# Patient Record
Sex: FEMALE | URBAN - METROPOLITAN AREA
[De-identification: names, ages, dates, MRNs, and addresses within clinical notes are randomized per-mention and may not be internally consistent; named-entity substitution may affect disease eponyms.]

---

## 2017-01-10 ENCOUNTER — TELEPHONE (OUTPATIENT)
Dept: FAMILY MEDICINE CLINIC | Facility: CLINIC | Age: 62
End: 2017-01-10

## 2017-01-10 NOTE — LETTER
1/11/2017 9:27 AM 
 
Ms. Marlyn Chamberlain [facility] [address] [phone] Dear Ms. Daniel: 
 
We've missed you! Please call our office at [phone] and schedule a follow up appointment for your continued care.  
 
 
 
Sincerely, 
 
 
RICK Marlow

## 2017-01-11 NOTE — TELEPHONE ENCOUNTER
Attempted to contact patient Venita Hall regarding need to follow up unable to leave voicemail, will send patient letter. Call back number left and I myself or one of the other nurses will attempt to contact again.

## 2018-09-04 ENCOUNTER — OFFICE (OUTPATIENT)
Dept: URBAN - METROPOLITAN AREA CLINIC 101 | Facility: CLINIC | Age: 63
End: 2018-09-04

## 2018-09-04 VITALS
WEIGHT: 185 LBS | DIASTOLIC BLOOD PRESSURE: 72 MMHG | HEART RATE: 78 BPM | TEMPERATURE: 98.6 F | HEIGHT: 66 IN | SYSTOLIC BLOOD PRESSURE: 118 MMHG

## 2018-09-04 DIAGNOSIS — R10.13 EPIGASTRIC PAIN: ICD-10-CM

## 2018-09-04 DIAGNOSIS — Z86.010 PERSONAL HISTORY OF COLONIC POLYPS: ICD-10-CM

## 2018-09-04 DIAGNOSIS — F45.8 OTHER SOMATOFORM DISORDERS: ICD-10-CM

## 2018-09-04 DIAGNOSIS — R63.4 ABNORMAL WEIGHT LOSS: ICD-10-CM

## 2018-09-04 DIAGNOSIS — K30 FUNCTIONAL DYSPEPSIA: ICD-10-CM

## 2018-09-04 PROCEDURE — 99214 OFFICE O/P EST MOD 30 MIN: CPT

## 2018-11-13 ENCOUNTER — ON CAMPUS - OUTPATIENT (OUTPATIENT)
Dept: URBAN - METROPOLITAN AREA HOSPITAL 35 | Facility: HOSPITAL | Age: 63
End: 2018-11-13
Payer: OTHER GOVERNMENT

## 2018-11-13 DIAGNOSIS — R68.81 EARLY SATIETY: ICD-10-CM

## 2018-11-13 DIAGNOSIS — R10.13 EPIGASTRIC PAIN: ICD-10-CM

## 2018-11-13 DIAGNOSIS — R63.4 ABNORMAL WEIGHT LOSS: ICD-10-CM

## 2018-11-13 DIAGNOSIS — B96.81 HELICOBACTER PYLORI [H. PYLORI] AS THE CAUSE OF DISEASES CLA: ICD-10-CM

## 2018-11-13 DIAGNOSIS — K29.60 OTHER GASTRITIS WITHOUT BLEEDING: ICD-10-CM

## 2018-11-13 PROCEDURE — 43239 EGD BIOPSY SINGLE/MULTIPLE: CPT

## 2019-01-02 ENCOUNTER — OFFICE (OUTPATIENT)
Dept: URBAN - METROPOLITAN AREA CLINIC 33 | Facility: CLINIC | Age: 64
End: 2019-01-02

## 2019-01-02 VITALS
DIASTOLIC BLOOD PRESSURE: 74 MMHG | TEMPERATURE: 97.5 F | HEIGHT: 66 IN | WEIGHT: 188 LBS | SYSTOLIC BLOOD PRESSURE: 132 MMHG | HEART RATE: 69 BPM

## 2019-01-02 DIAGNOSIS — R63.4 ABNORMAL WEIGHT LOSS: ICD-10-CM

## 2019-01-02 DIAGNOSIS — Z86.010 PERSONAL HISTORY OF COLONIC POLYPS: ICD-10-CM

## 2019-01-02 DIAGNOSIS — R63.0 ANOREXIA: ICD-10-CM

## 2019-01-02 DIAGNOSIS — R10.13 EPIGASTRIC PAIN: ICD-10-CM

## 2019-01-02 DIAGNOSIS — B96.81 HELICOBACTER PYLORI [H. PYLORI] AS THE CAUSE OF DISEASES CLA: ICD-10-CM

## 2019-01-02 PROCEDURE — 99215 OFFICE O/P EST HI 40 MIN: CPT

## 2019-01-02 PROCEDURE — 00031: CPT

## 2019-01-02 RX ORDER — LEVOFLOXACIN 500 MG/1
TABLET, FILM COATED ORAL
Qty: 14 | Refills: 0 | Status: COMPLETED
Start: 2019-01-02 | End: 2020-03-05

## 2019-01-02 RX ORDER — AMOXICILLIN 500 MG/1
CAPSULE ORAL
Qty: 56 | Refills: 0 | Status: COMPLETED
Start: 2019-01-02 | End: 2020-03-05

## 2019-01-02 RX ORDER — SUCRALFATE 1 G/1
TABLET ORAL
Qty: 120 | Refills: 1 | Status: COMPLETED
End: 2020-03-05

## 2019-01-02 NOTE — SERVICEHPINOTES
GUSTAVO VENEGAS   is a   63  female who complains of f/u. EGD done 11/13/18 was positive for h pylori. She was given option #1. She developed heart palpitations with clarithromycin and bc of this we switched her to flagyl. She then  developed watery diarrhea.  Stopped treatment approx. 5-7 days in. Stool studies following this were negative along with neg c diff. She was then dx with bronchitis and was given augmentin--finished course a few days ago and had no SEs w/ this. She was taking align and daily yogurt. She reports diarrhea has since resolved and BMs are normal, BSS type 4, 1-2x/day. States she continues to have epigastric pain. Some mild nausea but no vomiting. she was supposed to get CT scan following EGD but states she lost the order. She is concerned about her pancreas. She does not have diabetes. She has lost weight and is concerned about this (per our records is 3 lbs heavier than Sept OV). Render Post-Care Instructions In Note?: no

## 2019-03-13 ENCOUNTER — ON CAMPUS - OUTPATIENT (OUTPATIENT)
Dept: URBAN - METROPOLITAN AREA HOSPITAL 35 | Facility: HOSPITAL | Age: 64
End: 2019-03-13
Payer: OTHER GOVERNMENT

## 2019-03-13 DIAGNOSIS — D12.2 BENIGN NEOPLASM OF ASCENDING COLON: ICD-10-CM

## 2019-03-13 DIAGNOSIS — D12.3 BENIGN NEOPLASM OF TRANSVERSE COLON: ICD-10-CM

## 2019-03-13 DIAGNOSIS — Z86.010 PERSONAL HISTORY OF COLONIC POLYPS: ICD-10-CM

## 2019-03-13 PROCEDURE — 45380 COLONOSCOPY AND BIOPSY: CPT | Mod: PT

## 2019-03-27 ENCOUNTER — OFFICE (OUTPATIENT)
Dept: URBAN - METROPOLITAN AREA CLINIC 33 | Facility: CLINIC | Age: 64
End: 2019-03-27

## 2019-03-27 DIAGNOSIS — R14.0 ABDOMINAL DISTENSION (GASEOUS): ICD-10-CM

## 2019-03-27 DIAGNOSIS — R10.13 EPIGASTRIC PAIN: ICD-10-CM

## 2019-03-27 DIAGNOSIS — R63.4 ABNORMAL WEIGHT LOSS: ICD-10-CM

## 2019-03-27 PROCEDURE — 91065 BREATH HYDROGEN/METHANE TEST: CPT

## 2019-04-30 ENCOUNTER — OFFICE (OUTPATIENT)
Dept: URBAN - METROPOLITAN AREA CLINIC 101 | Facility: CLINIC | Age: 64
End: 2019-04-30

## 2019-04-30 VITALS
SYSTOLIC BLOOD PRESSURE: 152 MMHG | DIASTOLIC BLOOD PRESSURE: 87 MMHG | HEART RATE: 78 BPM | HEIGHT: 66 IN | TEMPERATURE: 97.7 F | WEIGHT: 198 LBS

## 2019-04-30 DIAGNOSIS — R10.33 PERIUMBILICAL PAIN: ICD-10-CM

## 2019-04-30 DIAGNOSIS — R63.0 ANOREXIA: ICD-10-CM

## 2019-04-30 DIAGNOSIS — R63.4 ABNORMAL WEIGHT LOSS: ICD-10-CM

## 2019-04-30 PROCEDURE — 99214 OFFICE O/P EST MOD 30 MIN: CPT

## 2019-08-05 ENCOUNTER — APPOINTMENT (RX ONLY)
Dept: URBAN - METROPOLITAN AREA CLINIC 371 | Facility: CLINIC | Age: 64
Setting detail: DERMATOLOGY
End: 2019-08-05

## 2019-08-05 DIAGNOSIS — D22 MELANOCYTIC NEVI: ICD-10-CM

## 2019-08-05 DIAGNOSIS — D17 BENIGN LIPOMATOUS NEOPLASM: ICD-10-CM

## 2019-08-05 PROBLEM — D22.39 MELANOCYTIC NEVI OF OTHER PARTS OF FACE: Status: ACTIVE | Noted: 2019-08-05

## 2019-08-05 PROBLEM — I10 ESSENTIAL (PRIMARY) HYPERTENSION: Status: ACTIVE | Noted: 2019-08-05

## 2019-08-05 PROBLEM — D17.1 BENIGN LIPOMATOUS NEOPLASM OF SKIN AND SUBCUTANEOUS TISSUE OF TRUNK: Status: ACTIVE | Noted: 2019-08-05

## 2019-08-05 PROBLEM — K21.9 GASTRO-ESOPHAGEAL REFLUX DISEASE WITHOUT ESOPHAGITIS: Status: ACTIVE | Noted: 2019-08-05

## 2019-08-05 PROBLEM — D17.22 BENIGN LIPOMATOUS NEOPLASM OF SKIN AND SUBCUTANEOUS TISSUE OF LEFT ARM: Status: ACTIVE | Noted: 2019-08-05

## 2019-08-05 PROBLEM — D48.5 NEOPLASM OF UNCERTAIN BEHAVIOR OF SKIN: Status: ACTIVE | Noted: 2019-08-05

## 2019-08-05 PROCEDURE — ? COUNSELING

## 2019-08-05 PROCEDURE — 99203 OFFICE O/P NEW LOW 30 MIN: CPT

## 2019-08-05 PROCEDURE — ? TREATMENT REGIMEN

## 2019-08-05 ASSESSMENT — LOCATION DETAILED DESCRIPTION DERM
LOCATION DETAILED: LEFT MEDIAL SUPERIOR CHEST
LOCATION DETAILED: LEFT SUPERIOR CENTRAL MALAR CHEEK
LOCATION DETAILED: LEFT ANTERIOR SHOULDER
LOCATION DETAILED: LEFT POSTAURICULAR SKIN

## 2019-08-05 ASSESSMENT — LOCATION SIMPLE DESCRIPTION DERM
LOCATION SIMPLE: POSTERIOR SCALP
LOCATION SIMPLE: CHEST
LOCATION SIMPLE: LEFT SHOULDER
LOCATION SIMPLE: LEFT CHEEK

## 2019-08-05 ASSESSMENT — LOCATION ZONE DERM
LOCATION ZONE: TRUNK
LOCATION ZONE: ARM
LOCATION ZONE: FACE
LOCATION ZONE: SCALP

## 2019-08-05 NOTE — PROCEDURE: TREATMENT REGIMEN
Plan: Recommended that patient follow up with general surgeon or orthopedist  for removal due to muscle impingement.
Detail Level: Zone

## 2019-09-11 ENCOUNTER — OFFICE (OUTPATIENT)
Dept: URBAN - METROPOLITAN AREA CLINIC 78 | Facility: CLINIC | Age: 64
End: 2019-09-11

## 2019-09-11 PROCEDURE — 00014: CPT

## 2019-10-25 ENCOUNTER — OFFICE (OUTPATIENT)
Dept: URBAN - METROPOLITAN AREA CLINIC 101 | Facility: CLINIC | Age: 64
End: 2019-10-25

## 2019-10-25 PROCEDURE — 00001: CPT

## 2020-03-05 ENCOUNTER — OFFICE (OUTPATIENT)
Dept: URBAN - METROPOLITAN AREA CLINIC 34 | Facility: CLINIC | Age: 65
End: 2020-03-05

## 2020-03-05 VITALS
WEIGHT: 196.4 LBS | SYSTOLIC BLOOD PRESSURE: 130 MMHG | HEART RATE: 78 BPM | TEMPERATURE: 97.3 F | DIASTOLIC BLOOD PRESSURE: 82 MMHG | HEIGHT: 66 IN

## 2020-03-05 DIAGNOSIS — R10.84 GENERALIZED ABDOMINAL PAIN: ICD-10-CM

## 2020-03-05 DIAGNOSIS — K30 FUNCTIONAL DYSPEPSIA: ICD-10-CM

## 2020-03-05 PROCEDURE — 99214 OFFICE O/P EST MOD 30 MIN: CPT | Performed by: INTERNAL MEDICINE

## 2020-03-05 RX ORDER — AMITRIPTYLINE HYDROCHLORIDE 25 MG/1
TABLET, FILM COATED ORAL
Qty: 30 | Refills: 1 | Status: COMPLETED
Start: 2020-03-05 | End: 2021-11-12

## 2020-11-11 ENCOUNTER — TELEHEALTH PROVIDED OTHER THAN IN PATIENT'S HOME (OUTPATIENT)
Dept: URBAN - METROPOLITAN AREA TELEHEALTH 7 | Facility: TELEHEALTH | Age: 65
End: 2020-11-11
Payer: OTHER GOVERNMENT

## 2020-11-11 VITALS — WEIGHT: 197 LBS | HEIGHT: 66 IN

## 2020-11-11 DIAGNOSIS — R10.33 PERIUMBILICAL PAIN: ICD-10-CM

## 2020-11-11 DIAGNOSIS — R10.84 GENERALIZED ABDOMINAL PAIN: ICD-10-CM

## 2020-11-11 DIAGNOSIS — Z86.010 PERSONAL HISTORY OF COLONIC POLYPS: ICD-10-CM

## 2020-11-11 PROCEDURE — 99215 OFFICE O/P EST HI 40 MIN: CPT | Mod: GT | Performed by: INTERNAL MEDICINE

## 2020-11-11 RX ORDER — ESCITALOPRAM OXALATE 10 MG/1
TABLET, FILM COATED ORAL
Qty: 30 | Refills: 2 | Status: COMPLETED
Start: 2020-11-11 | End: 2021-11-12

## 2020-11-11 NOTE — SERVICEHPINOTES
PATIENT VERIFIED BY DATE OF BIRTH AND NAME. Patient has been consented for this telecommunication visit. Ms. Singh is here for follow up for her ongoing abdominal pain symptoms. Described as daily burning, bloating, distension. She's tried various PPI regimens, Carafate, and Levsin with no relief. She has been worked up with EGD, colonoscopy, and imaging including neg CTA imaging. CT imaging revealed left kidney cystic lesions, and she is going to see Urology regarding this. She continues to abuse tobacco daily, found to have atherosclerosis of arteries on CT. Some food fear as well. Tremendous personal stressors as well.  Last year, she tested + for SIBO, but she hasn't responded well to the Xifaxan Rx. She is still getting abdominal pain. This is described as a burning, "gnawing feeling." She gets a trapped gas pain feeling. She still has some constipation with incomplete evacuation and she's tried various laxatives with minimal relief to the pain.  She was given a trial of Amitriptyline and felt "heart racing," so she stopped this medication.  She takes Ambien for sleep and thinks this can cause some constipation.  She's had tremendous stressors with noisy neighbors and involving the police.  Also, the stress of the recent election and pandemic has contributed to her stress and anxiety.All 10 point review of systems have been reviewed as per HPI and otherwise negative. BR

## 2020-11-11 NOTE — SERVICENOTES
Time spent with patient exceeded 60 minutes.  Patient's visit was conducted through video telecommunication. Patient consented before the start of visit as to understanding of privacy concerns, possible technological failure, and their responsibility of carrying out instructions of plan.

## 2021-11-12 ENCOUNTER — TELEHEALTH PROVIDED OTHER THAN IN PATIENT'S HOME (OUTPATIENT)
Dept: URBAN - METROPOLITAN AREA TELEHEALTH 7 | Facility: TELEHEALTH | Age: 66
End: 2021-11-12

## 2021-11-12 VITALS — WEIGHT: 188 LBS | HEIGHT: 66 IN

## 2021-11-12 DIAGNOSIS — Z86.010 PERSONAL HISTORY OF COLONIC POLYPS: ICD-10-CM

## 2021-11-12 DIAGNOSIS — R13.10 DYSPHAGIA, UNSPECIFIED: ICD-10-CM

## 2021-11-12 DIAGNOSIS — R10.33 PERIUMBILICAL PAIN: ICD-10-CM

## 2021-11-12 DIAGNOSIS — R10.84 GENERALIZED ABDOMINAL PAIN: ICD-10-CM

## 2021-11-12 PROCEDURE — 99214 OFFICE O/P EST MOD 30 MIN: CPT | Mod: GT | Performed by: INTERNAL MEDICINE

## 2021-11-12 NOTE — SERVICEHPINOTES
PATIENT VERIFIED BY DATE OF BIRTH AND NAME. Patient has been consented for this telecommunication visit.
dominique foxMs. Singh is her for follow up for GERD and IBS.  Described as daily burning, bloating, distension. She's tried various PPI regimens, Carafate, and Levsin with no relief. She has been worked up with EGD, colonoscopy, and imaging including neg CTA imaging. CT imaging revealed left kidney cystic lesions, and she is going to see Urology regarding this. She continues to abuse tobacco daily, found to have atherosclerosis of arteries on CT. Some food fear as well. Tremendous personal stressors as well. Last year, she tested + for SIBO, but she hasn't responded well to the Xifaxan Rx. She is still getting abdominal pain. This is described as a burning, "gnawing feeling." She gets a trapped gas pain feeling. She still has some constipation with incomplete evacuation and she's tried various laxatives with minimal relief to the pain. She was given a trial of Amitriptyline and felt "heart racing," so she stopped this medication. She takes Ambien for sleep and thinks this can cause some constipation. She's had tremendous stressors with noisy neighbors and involving the police. Also, the stress of the recent election and pandemic has contributed to her stress and anxiety.  These stressors have improved.
br
brLately, she's had some globus and occasional reflux and dysphagia complaints.  Also noticed a streak of bright red blood but this has improved.  Last colon in 2018 which revealed adenomatous polyps.  She continues to abuse tobacco.  All 10 point review of systems have been reviewed as per HPI and otherwise negative.

## 2021-11-15 ENCOUNTER — OFFICE (OUTPATIENT)
Dept: URBAN - METROPOLITAN AREA CLINIC 102 | Facility: CLINIC | Age: 66
End: 2021-11-15

## 2021-11-15 PROCEDURE — 00038: CPT | Performed by: INTERNAL MEDICINE

## 2022-04-11 ENCOUNTER — OFFICE (OUTPATIENT)
Dept: URBAN - METROPOLITAN AREA CLINIC 30 | Facility: CLINIC | Age: 67
End: 2022-04-11
Payer: OTHER GOVERNMENT

## 2022-04-11 ENCOUNTER — OFFICE (OUTPATIENT)
Dept: URBAN - METROPOLITAN AREA PATHOLOGY 18 | Facility: PATHOLOGY | Age: 67
End: 2022-04-11
Payer: OTHER GOVERNMENT

## 2022-04-11 VITALS
HEART RATE: 82 BPM | DIASTOLIC BLOOD PRESSURE: 82 MMHG | HEART RATE: 75 BPM | TEMPERATURE: 97.9 F | OXYGEN SATURATION: 98 % | SYSTOLIC BLOOD PRESSURE: 99 MMHG | SYSTOLIC BLOOD PRESSURE: 124 MMHG | DIASTOLIC BLOOD PRESSURE: 69 MMHG | RESPIRATION RATE: 18 BRPM | HEART RATE: 76 BPM | DIASTOLIC BLOOD PRESSURE: 76 MMHG | WEIGHT: 188 LBS | HEART RATE: 79 BPM | SYSTOLIC BLOOD PRESSURE: 129 MMHG | SYSTOLIC BLOOD PRESSURE: 131 MMHG | DIASTOLIC BLOOD PRESSURE: 79 MMHG | HEIGHT: 66 IN | RESPIRATION RATE: 16 BRPM | SYSTOLIC BLOOD PRESSURE: 106 MMHG | TEMPERATURE: 98.1 F | DIASTOLIC BLOOD PRESSURE: 60 MMHG | SYSTOLIC BLOOD PRESSURE: 112 MMHG | DIASTOLIC BLOOD PRESSURE: 71 MMHG | HEART RATE: 78 BPM | RESPIRATION RATE: 17 BRPM | RESPIRATION RATE: 23 BRPM | OXYGEN SATURATION: 99 % | HEART RATE: 66 BPM | OXYGEN SATURATION: 95 % | SYSTOLIC BLOOD PRESSURE: 143 MMHG | RESPIRATION RATE: 10 BRPM | HEART RATE: 80 BPM | RESPIRATION RATE: 34 BRPM | DIASTOLIC BLOOD PRESSURE: 55 MMHG

## 2022-04-11 DIAGNOSIS — R13.10 DYSPHAGIA, UNSPECIFIED: ICD-10-CM

## 2022-04-11 DIAGNOSIS — K31.89 OTHER DISEASES OF STOMACH AND DUODENUM: ICD-10-CM

## 2022-04-11 DIAGNOSIS — K22.2 ESOPHAGEAL OBSTRUCTION: ICD-10-CM

## 2022-04-11 DIAGNOSIS — Z86.010 PERSONAL HISTORY OF COLONIC POLYPS: ICD-10-CM

## 2022-04-11 DIAGNOSIS — K29.60 OTHER GASTRITIS WITHOUT BLEEDING: ICD-10-CM

## 2022-04-11 DIAGNOSIS — R10.13 EPIGASTRIC PAIN: ICD-10-CM

## 2022-04-11 DIAGNOSIS — B96.81 HELICOBACTER PYLORI [H. PYLORI] AS THE CAUSE OF DISEASES CLA: ICD-10-CM

## 2022-04-11 DIAGNOSIS — K57.30 DIVERTICULOSIS OF LARGE INTESTINE WITHOUT PERFORATION OR ABS: ICD-10-CM

## 2022-04-11 PROCEDURE — 88342 IMHCHEM/IMCYTCHM 1ST ANTB: CPT | Performed by: PATHOLOGY

## 2022-04-11 PROCEDURE — 88305 TISSUE EXAM BY PATHOLOGIST: CPT | Performed by: PATHOLOGY

## 2022-04-11 PROCEDURE — 88313 SPECIAL STAINS GROUP 2: CPT | Performed by: PATHOLOGY

## 2022-04-11 RX ORDER — PHENOBARBITAL, HYOSCYAMINE SULFATE, ATROPINE SULFATE, SCOPOLAMINE HYDROBROMIDE 16.2; .1307; .0194; .0065 MG/5ML; MG/5ML; MG/5ML; MG/5ML
ELIXIR ORAL
Qty: 300 | Refills: 2 | Status: COMPLETED
Start: 2022-04-11 | End: 2023-09-19

## 2023-09-19 ENCOUNTER — TELEHEALTH PROVIDED OTHER THAN IN PATIENT'S HOME (OUTPATIENT)
Dept: URBAN - METROPOLITAN AREA TELEHEALTH 3 | Facility: TELEHEALTH | Age: 68
End: 2023-09-19
Payer: OTHER GOVERNMENT

## 2023-09-19 VITALS — WEIGHT: 183 LBS | HEIGHT: 66 IN

## 2023-09-19 DIAGNOSIS — D32.9 BENIGN NEOPLASM OF MENINGES, UNSPECIFIED: ICD-10-CM

## 2023-09-19 DIAGNOSIS — Z72.0 TOBACCO USE: ICD-10-CM

## 2023-09-19 DIAGNOSIS — R63.4 ABNORMAL WEIGHT LOSS: ICD-10-CM

## 2023-09-19 DIAGNOSIS — K30 FUNCTIONAL DYSPEPSIA: ICD-10-CM

## 2023-09-19 PROCEDURE — 99215 OFFICE O/P EST HI 40 MIN: CPT | Mod: 95 | Performed by: INTERNAL MEDICINE

## 2023-09-19 NOTE — SERVICEHPINOTES
PATIENT VERIFIED BY DATE OF BIRTH AND NAME. Patient has been consented for this telecommunication visit.
dominique MaxwellSusan Singh is her for follow up for GERD and IBS. Described as daily burning, bloating, distension. She's tried various PPI regimens, Carafate, and Levsin with no relief. She has been worked up with EGD, colonoscopy, and imaging including neg CTA imaging. CT imaging revealed left kidney cystic lesions, and she is going to see Urology regarding this. She continues to abuse tobacco daily, found to have atherosclerosis of arteries on CT. Some food fear as well. Tremendous personal stressors as well. Last year, she tested + for SIBO, but she hasn't responded well to the Xifaxan Rx.She is still getting abdominal pain. This is described as a burning, "gnawing feeling." She gets a trapped gas pain feeling. She still has some constipation with incomplete evacuation and she's tried various laxatives with minimal relief to the pain.She was given a trial of Amitriptyline and felt "heart racing," so she stopped this medication. She takes Ambien for sleep and thinks this can cause some constipation. She's had tremendous stressors with noisy neighbors and involving the police. Also, the stress of the recent election and pandemic has contributed to her stress and anxiety. These stressors have improved.She just tested positive for H pylori, about to start Rx.  Also, diagnosed with a meningioma earlier this year and had gamma knife Rx for this at [facility].All 10 point review of systems have been reviewed as per HPI and otherwise negative. dominique

## 2023-09-19 NOTE — SERVICENOTES
Patient's visit was conducted through [employer] video telecommunication. Patient consented before the start of visit as to understanding of privacy concerns, possible technological failure, and their responsibility of carrying out instructions of plan.

## 2023-09-20 PROBLEM — K31.89 OTHER DISEASES OF STOMACH AND DUODENUM: Status: ACTIVE | Noted: 2022-04-11

## 2023-09-20 PROBLEM — K64.0 FIRST DEGREE HEMORRHOIDS: Status: ACTIVE | Noted: 2022-04-11

## 2023-09-20 PROBLEM — K57.30 DVRTCLOS OF LG INT W/O PERFORATION OR ABSCESS W/O BLEEDING: Status: ACTIVE | Noted: 2022-04-11

## 2023-09-20 PROBLEM — K22.2 ESOPHAGEAL OBSTRUCTION: Status: ACTIVE | Noted: 2022-04-11

## 2025-07-24 ENCOUNTER — OFFICE (OUTPATIENT)
Dept: URBAN - METROPOLITAN AREA CLINIC 102 | Facility: CLINIC | Age: 70
End: 2025-07-24
Payer: MEDICARE

## 2025-07-24 VITALS
SYSTOLIC BLOOD PRESSURE: 122 MMHG | HEIGHT: 66 IN | DIASTOLIC BLOOD PRESSURE: 90 MMHG | WEIGHT: 191 LBS | HEART RATE: 72 BPM | TEMPERATURE: 96.7 F | SYSTOLIC BLOOD PRESSURE: 144 MMHG | DIASTOLIC BLOOD PRESSURE: 73 MMHG

## 2025-07-24 DIAGNOSIS — R10.33 PERIUMBILICAL PAIN: ICD-10-CM

## 2025-07-24 DIAGNOSIS — K30 FUNCTIONAL DYSPEPSIA: ICD-10-CM

## 2025-07-24 DIAGNOSIS — Z86.0101 PERSONAL HISTORY OF ADENOMATOUS AND SERRATED COLON POLYPS: ICD-10-CM

## 2025-07-24 DIAGNOSIS — C67.9 MALIGNANT NEOPLASM OF BLADDER, UNSPECIFIED: ICD-10-CM

## 2025-07-24 PROCEDURE — 99215 OFFICE O/P EST HI 40 MIN: CPT | Performed by: INTERNAL MEDICINE

## 2025-07-24 NOTE — SERVICEHPINOTES
Ms. Singh is her for follow up for GERD and IBS. Described as daily burning, bloating, distension. She's tried various PPI regimens, Carafate, and Levsin with no relief. She has been worked up with EGD, colonoscopy, and imaging including neg CTA imaging. CT imaging revealed left kidney cystic lesions, and she is going to see Urology regarding this. She continues to abuse tobacco daily, found to have atherosclerosis of arteries on CT. Some food fear as well. Tremendous personal stressors as well. Last year, she tested + for SIBO, but she hasn't responded well to the Xifaxan Rx.She is still getting abdominal pain. This is described as a burning, "gnawing feeling." She gets a trapped gas pain feeling. She still has some constipation with incomplete evacuation and she's tried various laxatives with minimal relief to the pain.She was given a trial of Amitriptyline and felt "heart racing," so she stopped this medication. She takes Ambien for sleep and thinks this can cause some constipation. She's had tremendous stressors with noisy neighbors and involving the police. Also, the stress of the recent election and pandemic has contributed to her stress and anxiety. These stressors have improved.She just tested positive for H pylori, about to start Rx. Also, diagnosed with a meningioma earlier this year and had gamma knife Rx for this at [facility].  She was recently diagnosed with bladder cancer and underwent surgical resection.  She has no appetite - she has lost approx. 20 lbs unintentionally over the last year due to severe pain and decreased appetite.  She is very constipated.  She takes oxycodone for the chronic headaches - this does worsen the constipation . + incomplete evacuation reported.  She has tried Dulcolax and Miralax with some help.  dominique fox On last visit, she was given samples of Voquezna for GERD, IBSrela to treat the constipation, and scheduled to undergo EGD and C-scope.  She stopped these meds due to side effects.  dominique